# Patient Record
Sex: FEMALE | Race: BLACK OR AFRICAN AMERICAN | Employment: OTHER | URBAN - METROPOLITAN AREA
[De-identification: names, ages, dates, MRNs, and addresses within clinical notes are randomized per-mention and may not be internally consistent; named-entity substitution may affect disease eponyms.]

---

## 2024-08-01 ENCOUNTER — HOSPITAL ENCOUNTER (OUTPATIENT)
Facility: HOSPITAL | Age: 69
Discharge: HOME | End: 2024-08-04
Payer: MEDICARE

## 2024-08-01 ENCOUNTER — TRANSCRIBE ORDERS (OUTPATIENT)
Facility: HOSPITAL | Age: 69
End: 2024-08-01

## 2024-08-01 ENCOUNTER — HOSPITAL ENCOUNTER (OUTPATIENT)
Facility: HOSPITAL | Age: 69
Discharge: HOME | End: 2024-08-04

## 2024-08-01 DIAGNOSIS — N95.0 POSTMENOPAUSAL BLEEDING: ICD-10-CM

## 2024-08-01 DIAGNOSIS — N84.1 CERVICAL POLYP: ICD-10-CM

## 2024-08-01 DIAGNOSIS — N84.1 CERVICAL POLYP: Primary | ICD-10-CM

## 2024-08-01 LAB — LABCORP SPECIMEN COLLECTION: NORMAL

## 2024-08-01 PROCEDURE — 99001 SPECIMEN HANDLING PT-LAB: CPT

## 2024-08-01 PROCEDURE — 93005 ELECTROCARDIOGRAM TRACING: CPT

## 2024-08-03 LAB
EKG ATRIAL RATE: 76 BPM
EKG DIAGNOSIS: NORMAL
EKG P AXIS: 47 DEGREES
EKG P-R INTERVAL: 132 MS
EKG Q-T INTERVAL: 370 MS
EKG QRS DURATION: 86 MS
EKG QTC CALCULATION (BAZETT): 416 MS
EKG R AXIS: 6 DEGREES
EKG T AXIS: 34 DEGREES
EKG VENTRICULAR RATE: 76 BPM

## 2024-08-16 NOTE — H&P
AMANDA Teasdale  [address]  [facility] [address]  Tel:  [phone]  Fax: [phone]    Patient: Jami Belcher    YOB: 1955   Birth Sex: Female   Date: 08/05/2024 09:45 AM   Visit Type: Office Visit - GYN     Assessment/Plan  # Detail Type Description    1. Assessment Postmenopausal bleeding (N95.0).    Patient Plan PMB x 4 days w/ lower abdominal pain.  I d/w patient possible etiologies, evaluation and management.  Obtain TVUS and labs. 4/30/24 TT --> TVUS reviewed w/ patient.  Q/A.  She opts for a [facility] D[facility] endocervical polypectomy for further endometrial evaluation and sampling pending auth. 6/17/24 TT --> No new c/o.  Q/A.  Oxycodone/Cytotec; ERx.  Proceed w/ [facility] D&C endocervical polypectomy. 8/5/24 TT  - TVUS (6/13/24): AV (8.5 x 5.3 x 4.2 cm); irreg ET (8mm) w/ endocervical polyp (1cm); ovaries not seen.         2. Assessment Polyp of cervix uteri (N84.1).    Patient Plan See above.         3. Assessment Postmenopausal atrophic vaginitis (N95.2).    Patient Plan Atrophic Vaginitis: thinning and inflammation of the vaginal walls due to a decline in estrogen.  Recommend localized estrogen treatment and vaginal moisturizers/lubricants prn.  Trial of Replens biw.  FU prn.         4. Assessment Body mass index [BMI] 40.0-44.9, adult (Z68.41).    Patient Plan I emphasized the increased risks for continued estrogen production by her peripheral adipose tissue converting, via aromatization, adrenal androgens into estradiol.  Not to mention, increased risks for DM, HTN, hyperlipidemia, CAD, decreased quality of life and shorter life expectancy.  Encouraged healthy diet, exercise and lifestyle changes.  Reviewed BMI and encouraged 10% weight loss over the next year.  Recommend f/u q 3 mo, or as needed.         5. Assessment Personal history of cancer of breast (Z85.3).         6. Assessment Chronic atrial fibrillation, unspecified (I48.20).    Patient Plan Cont close f/u w/  2024   N   hydrocortisone acetate 1 % topical cream apply by topical route 2 times every day a thin layer to the affected area(s) 2024   N   losartan 50 mg tablet take 1 tablet by oral route  every day 2024   N   magnesium 400 mg (as magnesium oxide) capsule  2024   N   metformin 500 mg tablet take 1 tablet by oral route 2 times every day with morning and evening meals 2024   N   pantoprazole 40 mg tablet,delayed release take 1 tablet by oral route  every day 2024   N   potassium chloride 20 mEq/15 mL oral liquid take 15 milliliter by oral route  every day diluted in one-half glass (120 ml) of cold water or juice 2024   N   rosuvastatin 20 mg tablet take 1 tablet by oral route  every day 2024   N   spironolactone 25 mg tablet take 1 tablet by oral route  every day 2024   N   Vitamin D3 25 mcg (1,000 unit) tablet  2024   N       Medication Reconciliation  Medications reconciled today.      Allergies  Ingredient Reaction (Severity) Comment   NO KNOWN ALLERGIES           Family History    (Detailed)  Relationship Family Member Name  Age at Death Condition Onset Age Cause of Death   Father    Diabetes mellitus type 2  N     Social History  (Detailed)  Preferred language is English.      Marital Status/Family/Social Support  Marital status:        Review of Systems  System Neg/Pos Details   Constitutional Negative Chills, Fatigue and Fever.   Respiratory Negative Cough, Dyspnea and Wheezing.   Cardio Negative Chest pain and Edema.   GI Positive Abdominal pain.   GI Negative Constipation, Diarrhea, Heartburn, Nausea and Vomiting.    Negative Dysuria, Hematuria, Urinary frequency, Urinary incontinence and Urinary retention.   Reproductive Positive Irregular menses, Menarche age (Menarche age was 14), Menses (Menses is Irregular), The patient is post-menopausal (The menopause was chemo induced), Vaginal dryness.   Reproductive Negative History of

## 2024-08-23 ENCOUNTER — ANESTHESIA EVENT (OUTPATIENT)
Facility: HOSPITAL | Age: 69
End: 2024-08-23
Payer: MEDICARE

## 2024-08-28 ENCOUNTER — HOSPITAL ENCOUNTER (OUTPATIENT)
Facility: HOSPITAL | Age: 69
Setting detail: OUTPATIENT SURGERY
Discharge: HOME | End: 2024-08-28
Attending: OBSTETRICS & GYNECOLOGY | Admitting: OBSTETRICS & GYNECOLOGY
Payer: MEDICARE

## 2024-08-28 ENCOUNTER — ANESTHESIA (OUTPATIENT)
Facility: HOSPITAL | Age: 69
End: 2024-08-28
Payer: MEDICARE

## 2024-08-28 VITALS
OXYGEN SATURATION: 100 % | SYSTOLIC BLOOD PRESSURE: 134 MMHG | DIASTOLIC BLOOD PRESSURE: 72 MMHG | WEIGHT: 254.25 LBS | TEMPERATURE: 97 F | HEART RATE: 64 BPM | RESPIRATION RATE: 16 BRPM | BODY MASS INDEX: 42.36 KG/M2 | HEIGHT: 65 IN

## 2024-08-28 PROBLEM — N95.0 POSTMENOPAUSAL BLEEDING: Chronic | Status: RESOLVED | Noted: 2024-08-28 | Resolved: 2024-08-28

## 2024-08-28 PROBLEM — N95.0 POSTMENOPAUSAL BLEEDING: Chronic | Status: ACTIVE | Noted: 2024-08-28

## 2024-08-28 PROBLEM — N85.00 ENDOMETRIAL HYPERPLASIA, UNSPECIFIED: Chronic | Status: ACTIVE | Noted: 2024-08-28

## 2024-08-28 PROBLEM — N84.1 POLYP OF CERVIX UTERI: Chronic | Status: RESOLVED | Noted: 2024-08-28 | Resolved: 2024-08-28

## 2024-08-28 PROBLEM — N84.1 POLYP OF CERVIX UTERI: Chronic | Status: ACTIVE | Noted: 2024-08-28

## 2024-08-28 PROBLEM — N85.00 ENDOMETRIAL HYPERPLASIA, UNSPECIFIED: Chronic | Status: RESOLVED | Noted: 2024-08-28 | Resolved: 2024-08-28

## 2024-08-28 LAB
GLUCOSE BLD STRIP.AUTO-MCNC: 112 MG/DL (ref 70–110)
GLUCOSE BLD STRIP.AUTO-MCNC: 129 MG/DL (ref 70–110)

## 2024-08-28 PROCEDURE — 88305 TISSUE EXAM BY PATHOLOGIST: CPT

## 2024-08-28 PROCEDURE — 2720000010 HC SURG SUPPLY STERILE: Performed by: OBSTETRICS & GYNECOLOGY

## 2024-08-28 PROCEDURE — 6360000002 HC RX W HCPCS: Performed by: OBSTETRICS & GYNECOLOGY

## 2024-08-28 PROCEDURE — 7100000001 HC PACU RECOVERY - ADDTL 15 MIN: Performed by: OBSTETRICS & GYNECOLOGY

## 2024-08-28 PROCEDURE — 7100000000 HC PACU RECOVERY - FIRST 15 MIN: Performed by: OBSTETRICS & GYNECOLOGY

## 2024-08-28 PROCEDURE — 6360000002 HC RX W HCPCS: Performed by: NURSE ANESTHETIST, CERTIFIED REGISTERED

## 2024-08-28 PROCEDURE — 3700000000 HC ANESTHESIA ATTENDED CARE: Performed by: OBSTETRICS & GYNECOLOGY

## 2024-08-28 PROCEDURE — 2580000003 HC RX 258: Performed by: OBSTETRICS & GYNECOLOGY

## 2024-08-28 PROCEDURE — 7100000011 HC PHASE II RECOVERY - ADDTL 15 MIN: Performed by: OBSTETRICS & GYNECOLOGY

## 2024-08-28 PROCEDURE — 3600000012 HC SURGERY LEVEL 2 ADDTL 15MIN: Performed by: OBSTETRICS & GYNECOLOGY

## 2024-08-28 PROCEDURE — 3600000002 HC SURGERY LEVEL 2 BASE: Performed by: OBSTETRICS & GYNECOLOGY

## 2024-08-28 PROCEDURE — 3700000001 HC ADD 15 MINUTES (ANESTHESIA): Performed by: OBSTETRICS & GYNECOLOGY

## 2024-08-28 PROCEDURE — 82962 GLUCOSE BLOOD TEST: CPT

## 2024-08-28 PROCEDURE — 2500000003 HC RX 250 WO HCPCS: Performed by: NURSE ANESTHETIST, CERTIFIED REGISTERED

## 2024-08-28 PROCEDURE — 2709999900 HC NON-CHARGEABLE SUPPLY: Performed by: OBSTETRICS & GYNECOLOGY

## 2024-08-28 PROCEDURE — 7100000010 HC PHASE II RECOVERY - FIRST 15 MIN: Performed by: OBSTETRICS & GYNECOLOGY

## 2024-08-28 RX ORDER — DEXAMETHASONE SODIUM PHOSPHATE 4 MG/ML
INJECTION, SOLUTION INTRA-ARTICULAR; INTRALESIONAL; INTRAMUSCULAR; INTRAVENOUS; SOFT TISSUE PRN
Status: DISCONTINUED | OUTPATIENT
Start: 2024-08-28 | End: 2024-08-28 | Stop reason: SDUPTHER

## 2024-08-28 RX ORDER — HYDROMORPHONE HYDROCHLORIDE 1 MG/ML
0.5 INJECTION, SOLUTION INTRAMUSCULAR; INTRAVENOUS; SUBCUTANEOUS EVERY 5 MIN PRN
Status: DISCONTINUED | OUTPATIENT
Start: 2024-08-28 | End: 2024-08-28 | Stop reason: HOSPADM

## 2024-08-28 RX ORDER — SODIUM CHLORIDE 0.9 % (FLUSH) 0.9 %
5-40 SYRINGE (ML) INJECTION EVERY 12 HOURS SCHEDULED
Status: DISCONTINUED | OUTPATIENT
Start: 2024-08-28 | End: 2024-08-28 | Stop reason: HOSPADM

## 2024-08-28 RX ORDER — NALOXONE HYDROCHLORIDE 0.4 MG/ML
INJECTION, SOLUTION INTRAMUSCULAR; INTRAVENOUS; SUBCUTANEOUS PRN
Status: DISCONTINUED | OUTPATIENT
Start: 2024-08-28 | End: 2024-08-28 | Stop reason: HOSPADM

## 2024-08-28 RX ORDER — IPRATROPIUM BROMIDE AND ALBUTEROL SULFATE 2.5; .5 MG/3ML; MG/3ML
1 SOLUTION RESPIRATORY (INHALATION)
Status: DISCONTINUED | OUTPATIENT
Start: 2024-08-28 | End: 2024-08-28 | Stop reason: HOSPADM

## 2024-08-28 RX ORDER — MIDAZOLAM HYDROCHLORIDE 1 MG/ML
INJECTION INTRAMUSCULAR; INTRAVENOUS PRN
Status: DISCONTINUED | OUTPATIENT
Start: 2024-08-28 | End: 2024-08-28 | Stop reason: SDUPTHER

## 2024-08-28 RX ORDER — LIDOCAINE HYDROCHLORIDE 20 MG/ML
INJECTION, SOLUTION EPIDURAL; INFILTRATION; INTRACAUDAL; PERINEURAL PRN
Status: DISCONTINUED | OUTPATIENT
Start: 2024-08-28 | End: 2024-08-28 | Stop reason: SDUPTHER

## 2024-08-28 RX ORDER — OXYCODONE HYDROCHLORIDE 5 MG/1
5 TABLET ORAL PRN
Status: DISCONTINUED | OUTPATIENT
Start: 2024-08-28 | End: 2024-08-28 | Stop reason: HOSPADM

## 2024-08-28 RX ORDER — ONDANSETRON 2 MG/ML
INJECTION INTRAMUSCULAR; INTRAVENOUS PRN
Status: DISCONTINUED | OUTPATIENT
Start: 2024-08-28 | End: 2024-08-28 | Stop reason: SDUPTHER

## 2024-08-28 RX ORDER — SODIUM CHLORIDE, SODIUM LACTATE, POTASSIUM CHLORIDE, CALCIUM CHLORIDE 600; 310; 30; 20 MG/100ML; MG/100ML; MG/100ML; MG/100ML
INJECTION, SOLUTION INTRAVENOUS CONTINUOUS
Status: DISCONTINUED | OUTPATIENT
Start: 2024-08-28 | End: 2024-08-28 | Stop reason: HOSPADM

## 2024-08-28 RX ORDER — FENTANYL CITRATE 50 UG/ML
INJECTION, SOLUTION INTRAMUSCULAR; INTRAVENOUS PRN
Status: DISCONTINUED | OUTPATIENT
Start: 2024-08-28 | End: 2024-08-28 | Stop reason: SDUPTHER

## 2024-08-28 RX ORDER — FENTANYL CITRATE 50 UG/ML
25 INJECTION, SOLUTION INTRAMUSCULAR; INTRAVENOUS EVERY 5 MIN PRN
Status: DISCONTINUED | OUTPATIENT
Start: 2024-08-28 | End: 2024-08-28 | Stop reason: HOSPADM

## 2024-08-28 RX ORDER — BUPIVACAINE HYDROCHLORIDE 2.5 MG/ML
INJECTION, SOLUTION EPIDURAL; INFILTRATION; INTRACAUDAL PRN
Status: DISCONTINUED | OUTPATIENT
Start: 2024-08-28 | End: 2024-08-28 | Stop reason: ALTCHOICE

## 2024-08-28 RX ORDER — OXYCODONE HYDROCHLORIDE 5 MG/1
10 TABLET ORAL PRN
Status: DISCONTINUED | OUTPATIENT
Start: 2024-08-28 | End: 2024-08-28 | Stop reason: HOSPADM

## 2024-08-28 RX ORDER — SODIUM CHLORIDE 9 MG/ML
INJECTION, SOLUTION INTRAVENOUS PRN
Status: DISCONTINUED | OUTPATIENT
Start: 2024-08-28 | End: 2024-08-28 | Stop reason: HOSPADM

## 2024-08-28 RX ORDER — METOCLOPRAMIDE HYDROCHLORIDE 5 MG/ML
10 INJECTION INTRAMUSCULAR; INTRAVENOUS
Status: DISCONTINUED | OUTPATIENT
Start: 2024-08-28 | End: 2024-08-28 | Stop reason: HOSPADM

## 2024-08-28 RX ORDER — SODIUM CHLORIDE 0.9 % (FLUSH) 0.9 %
5-40 SYRINGE (ML) INJECTION PRN
Status: DISCONTINUED | OUTPATIENT
Start: 2024-08-28 | End: 2024-08-28 | Stop reason: HOSPADM

## 2024-08-28 RX ORDER — PROPOFOL 10 MG/ML
INJECTION, EMULSION INTRAVENOUS PRN
Status: DISCONTINUED | OUTPATIENT
Start: 2024-08-28 | End: 2024-08-28 | Stop reason: SDUPTHER

## 2024-08-28 RX ORDER — KETOROLAC TROMETHAMINE 30 MG/ML
INJECTION, SOLUTION INTRAMUSCULAR; INTRAVENOUS PRN
Status: DISCONTINUED | OUTPATIENT
Start: 2024-08-28 | End: 2024-08-28 | Stop reason: SDUPTHER

## 2024-08-28 RX ADMIN — LIDOCAINE HYDROCHLORIDE 80 MG: 20 INJECTION, SOLUTION EPIDURAL; INFILTRATION; INTRACAUDAL; PERINEURAL at 10:56

## 2024-08-28 RX ADMIN — SODIUM CHLORIDE, POTASSIUM CHLORIDE, SODIUM LACTATE AND CALCIUM CHLORIDE: 600; 310; 30; 20 INJECTION, SOLUTION INTRAVENOUS at 09:29

## 2024-08-28 RX ADMIN — KETOROLAC TROMETHAMINE 30 MG: 30 INJECTION, SOLUTION INTRAMUSCULAR; INTRAVENOUS at 11:23

## 2024-08-28 RX ADMIN — DEXAMETHASONE SODIUM PHOSPHATE 4 MG: 4 INJECTION, SOLUTION INTRAMUSCULAR; INTRAVENOUS at 10:59

## 2024-08-28 RX ADMIN — ONDANSETRON HYDROCHLORIDE 4 MG: 2 INJECTION INTRAMUSCULAR; INTRAVENOUS at 11:12

## 2024-08-28 RX ADMIN — MIDAZOLAM 2 MG: 1 INJECTION INTRAMUSCULAR; INTRAVENOUS at 10:48

## 2024-08-28 RX ADMIN — PROPOFOL 200 MG: 10 INJECTION, EMULSION INTRAVENOUS at 10:56

## 2024-08-28 RX ADMIN — FENTANYL CITRATE 100 MCG: 50 INJECTION, SOLUTION INTRAMUSCULAR; INTRAVENOUS at 10:56

## 2024-08-28 ASSESSMENT — PAIN SCALES - GENERAL
PAINLEVEL_OUTOF10: 0

## 2024-08-28 ASSESSMENT — PAIN - FUNCTIONAL ASSESSMENT: PAIN_FUNCTIONAL_ASSESSMENT: 0-10

## 2024-08-28 ASSESSMENT — LIFESTYLE VARIABLES: SMOKING_STATUS: 0

## 2024-08-28 NOTE — PERIOP NOTE
TRANSFER - OUT REPORT:    Verbal report given to GARRET Louis on Jami Belcher  being transferred to Phase II for routine progression of patient care       Report consisted of patient's Situation, Background, Assessment and   Recommendations(SBAR).     Information from the following report(s) Adult Overview, Surgery Report, Intake/Output, and MAR was reviewed with the receiving nurse.           Lines:   Peripheral IV 08/28/24 Left;Posterior Hand (Active)   Site Assessment Clean, dry & intact 08/28/24 1155   Line Status Infusing 08/28/24 1155   Line Care Connections checked and tightened 08/28/24 1155   Phlebitis Assessment No symptoms 08/28/24 1155   Infiltration Assessment 0 08/28/24 1155   Alcohol Cap Used No 08/28/24 0929   Dressing Status Clean, dry & intact 08/28/24 1155   Dressing Type Transparent 08/28/24 1155   Dressing Intervention New 08/28/24 0929        Opportunity for questions and clarification was provided.      Patient transported with:  Registered Nurse

## 2024-08-28 NOTE — PERIOP NOTE
Pt last took last dose of Eliquis Sunday,25,2024. Dr. Avelar notified. No new order received. Ok to proceed.

## 2024-08-28 NOTE — INTERVAL H&P NOTE
Update History & Physical    The patient's History and Physical of August 28, 2024 was reviewed with the patient and I examined the patient. There was no change. The surgical site was confirmed by the patient and me.     Plan: The risks, benefits, expected outcome, and alternative to the recommended procedure have been discussed with the patient. Patient understands and wants to proceed with the procedure.     Electronically signed by Lulu Avelar MD on 8/28/2024 at 10:48 AM

## 2024-08-28 NOTE — ANESTHESIA POSTPROCEDURE EVALUATION
Department of Anesthesiology  Postprocedure Note    Patient: Jami Belcher  MRN: [id number]  YOB: 1955  Date of evaluation: 8/28/2024    Procedure Summary       Date: 08/28/24 Room / Location: [facility] MAIN 04 / [facility] MAIN OR    Anesthesia Start: 1052 Anesthesia Stop: 1136    Procedure: HYSTEROSCOPY DILATATION AND CURETTAGE ENDOCERVICAL POLYPECTOMY \"SPEC POP\" (Uterus) Diagnosis:       Polyp, cervix      (Polyp, cervix [N84.1])    Surgeons: Lulu Avelar MD Responsible Provider: Rivas Day MD    Anesthesia Type: General ASA Status: 3            Anesthesia Type: General    Krys Phase I: Krys Score: 10    Krys Phase II: Krys Score: 10    Anesthesia Post Evaluation    Patient location during evaluation: PACU  Patient participation: complete - patient participated  Level of consciousness: awake and alert  Pain score: 0  Airway patency: patent  Nausea & Vomiting: no nausea and no vomiting  Cardiovascular status: blood pressure returned to baseline  Respiratory status: acceptable  Hydration status: euvolemic  Multimodal analgesia pain management approach        There were no known notable events for this encounter.

## 2024-08-28 NOTE — ANESTHESIA PRE PROCEDURE
Department of Anesthesiology  Preprocedure Note       Name:  Jami Belcher   Age:  68 y.o.  :  1955                                          MRN:  [id number]         Date:  2024      Surgeon: Surgeon(s):  Lulu Avelar MD    Procedure: Procedure(s):  HYSTEROSCOPY DILATATION AND CURETTAGE ENDOCERVICAL POLYPECTOMY \"SPEC POP\"    Medications prior to admission:   Prior to Admission medications    Medication Sig Start Date End Date Taking? Authorizing Provider   metoprolol succinate (TOPROL XL) 25 MG extended release tablet Take 1 tablet by mouth every morning   Yes Josue Scott MD   losartan (COZAAR) 25 MG tablet Take 1 tablet by mouth every morning   Yes Josue Scott MD   dilTIAZem (DILACOR XR) 240 MG extended release capsule Take 1 capsule by mouth every morning   Yes Josue Scott MD   spironolactone (ALDACTONE) 25 MG tablet Take 0.5 tablets by mouth every morning Indications: edema   Yes Josue Scott MD   Rosuvastatin Calcium 20 MG CPSP Take 20 mg by mouth nightly   Yes ProviderJosue MD   metFORMIN (GLUCOPHAGE) 500 MG tablet Take 1 tablet by mouth 2 times daily (with meals)   Yes Josue Scott MD   pantoprazole (PROTONIX) 40 MG tablet Take 1 tablet by mouth nightly   Yes Josue Scott MD   fluticasone (FLONASE) 50 MCG/ACT nasal spray 1 spray by Each Nostril route daily as needed for Rhinitis   Yes ProviderJosue MD   Multiple Vitamins-Minerals (CENTRUM VITAMINTS) CHEW Take by mouth   Yes Josue Scott MD   acetaminophen (TYLENOL) 500 MG tablet Take 1 tablet by mouth every 6 hours as needed for Pain   Yes Josue Scott MD   fexofenadine (ALLEGRA HIVES 24HR) 180 MG tablet Take 1 tablet by mouth daily as needed   Yes Josue Scott MD   dextromethorphan-guaiFENesin (MUCINEX DM)  MG per extended release tablet Take 1 tablet by mouth every 12 hours as needed for Cough or Congestion   Yes Josue Scott MD

## 2024-08-28 NOTE — DISCHARGE INSTRUCTIONS
[facility], [address], [place] [address]  [facility], [address], [place] [address]  Office: [phone]  Fax:    [phone]    PROCEDURE: Procedure(s):  HYSTEROSCOPY DILATATION AND CURETTAGE ENDOCERVICAL POLYPECTOMY \"SPEC POP\": 04024 (CPT®)     Notify [facility] IMMEDIATELY if any of the following occur:    You are unable to urinate.  Urgency to urinate is not uncommon.  Excessive vaginal bleeding > 1 maxi pad an hour for more then 2 hours straight.  Temperature above 101.0° and / or chills.  You are nauseous and / or vomiting and you cannot hold down any fluids.  Your pain is not controlled with the pain medication prescribed.    Special Considerations:     Do not drive for at least 24 hours after the procedure and until you are no longer taking narcotic pain medication and you are able to move and react without hesitation.  You may return to work the following day.      [x] Pelvic rest (nothing in the vagina) for 2 weeks.     [] No heavy lifting over 10 pounds & no strenuous exercise for 6 weeks.      [] Other instructions:         MEDICATIONS: PROVIDED AT DISCHARGE OR PREVIOUSLY PRESCRIBED AT PRE OPERATIVE APPOINTMENT WITH [facility] --  Narcotic Pain Med.   []  Vicodin®   [x]  Percocet®   []  Dilaudid®    Non-narcotic Pain Med.  [x]   Ibuprofen        Antibiotics   []  Cipro®   []  Keflex®     []  Bactrim DS®       Urgency   []   Vesicare®       Nausea      []    Zofran®     []    Phenergan®       Other   []    Colace          If you have not already scheduled your follow-up appointment please do so with our office staff. Your virtual appointment should be in 2 weeks.    Please contact [facility] at [phone] or go to the nearest Emergency Department / Urgent Care facility for any other medical questions or concerns.            DISCHARGE SUMMARY from Nurse    PATIENT INSTRUCTIONS:    After general anesthesia or  intravenous sedation, for 24 hours or while taking prescription Narcotics:  Limit your activities  Do not drive and operate hazardous machinery  Do not make important personal or business decisions  Do  not drink alcoholic beverages  If you have not urinated within 8 hours after discharge, please contact your surgeon on call.    Report the following to your surgeon:  Excessive pain, swelling, redness or odor of or around the surgical area  Temperature over 100.5  Nausea and vomiting lasting longer than 4 hours or if unable to take medications  Any signs of decreased circulation or nerve impairment to extremity: change in color, persistent  numbness, tingling, coldness or increase pain  Any questions    What to do at Home:  Recommended activity:     *  Please give a list of your current medications to your Primary Care Provider.    *  Please update this list whenever your medications are discontinued, doses are      changed, or new medications (including over-the-counter products) are added.    *  Please carry medication information at all times in case of emergency situations.    These are general instructions for a healthy lifestyle:    No smoking/ No tobacco products/ Avoid exposure to second hand smoke  Surgeon General's Warning:  Quitting smoking now greatly reduces serious risk to your health.    Obesity, smoking, and sedentary lifestyle greatly increases your risk for illness    A healthy diet, regular physical exercise & weight monitoring are important for maintaining a healthy lifestyle    You may be retaining fluid if you have a history of heart failure or if you experience any of the following symptoms:  Weight gain of 3 pounds or more overnight or 5 pounds in a week, increased swelling in our hands or feet or shortness of breath while lying flat in bed.  Please call your doctor as soon as you notice any of these symptoms; do not wait until your next office visit.     Patient armband removed and shredded

## 2024-08-28 NOTE — PERIOP NOTE
TRANSFER - IN REPORT:    Verbal report received from Sasha COMBS on Jami Belcher  being received from PACU for routine progression of patient care      Report consisted of patient's Situation, Background, Assessment and   Recommendations(SBAR).     Information from the following report(s) Nurse Handoff Report was reviewed with the receiving nurse.    Opportunity for questions and clarification was provided.      Assessment completed upon patient's arrival to unit and care assumed.      normal...

## 2024-08-28 NOTE — PERIOP NOTE
Reviewed PTA medication list with patient/caregiver and patient/caregiver denies any additional medications.     Patient admits to having a responsible adult care for them at home for at least 24 hours after surgery.    Patient encouraged to use gown warming system and informed that using said warming gown to regulate body temperature prior to a procedure has been shown to help reduce the risks of blood clots and infection.    Patient's pharmacy of choice verified and documented in PTA medication section.    Dual skin assessment & fall risk band verification completed with GARRET Barone.

## 2024-08-29 NOTE — OP NOTE
[address]  [address]                            OPERATIVE REPORT      PATIENT NAME: SHANNON TRONCOSO              : 1955  MED REC NO: [id number]                       ROOM: OR  ACCOUNT NO: [id number]                       ADMIT DATE: 2024  PROVIDER: Lulu Avelar MD    DATE OF SERVICE:  2024    PREOPERATIVE DIAGNOSES:       1. Postmenopausal bleeding.     2. Endocervical polyp.     3. Endometrial hyperplasia.     4. Personal history of breast cancer.     5. Chronic atrial fibrillation.     6. Morbid obesity, BMI 42.    POSTOPERATIVE DIAGNOSES:       1. Postmenopausal bleeding.     2. Endocervical polyp.     3. Endometrial hyperplasia.     4. Personal history of breast cancer.     5. Chronic atrial fibrillation.     6. Morbid obesity, BMI 42.    PROCEDURES PERFORMED:  Hysteroscopy, D and C, endocervical polypectomy with Aveta system.    SURGEON:  Lulu Avelar MD    ASSISTANT:  Tho Castillo.    ANESTHESIA:  General and paracervical block.    ESTIMATED BLOOD LOSS:  Minimal.    SPECIMENS REMOVED:  Endocervical polyp and endometrial curettings.    INTRAOPERATIVE FINDINGS:  Anteverted uterus sounded to 7 cm with very large polyploidy endocervical polyp noted.  Uterine cavity with multiple fundal synechiae noted, a dark spot noted anterior fundal near the right ostia, which was biopsied.  By end of case, all these abnormalities were removed.     COMPLICATIONS:  None.    IMPLANTS:  None.    INDICATIONS:  This is a 68-year-old with complaints of postmenopausal bleeding and lower abdominal pain.  She had a transvaginal ultrasound on 2024 demonstrating anteverted uterus 8.5 x 5.3 x 4.2 cm, irregular endometrial thickness 8 mm with endocervical polyp 1 cm.  Ovaries not seen.  Findings reviewed with the patient.  Risks, benefits, alternatives discussed and she opted for surgical management as stated above.  All questions

## (undated) DEVICE — SOLUTION IV LACTATED RINGERS INJECTION USP

## (undated) DEVICE — SYSTEM WST MGMT AVETA

## (undated) DEVICE — HYSTEROSCOPE RIGID CORAL AVETA DISP

## (undated) DEVICE — GARMENT,MEDLINE,DVT,INT,CALF,MED, GEN2: Brand: MEDLINE

## (undated) DEVICE — GLOVE SURG SZ 65 THK91MIL LTX FREE SYN POLYISOPRENE

## (undated) DEVICE — SYSTEM ENDOSCP FLUID MGMT CAP AVETA

## (undated) DEVICE — D&C HYSTEROSCOPY: Brand: MEDLINE INDUSTRIES, INC.

## (undated) DEVICE — Device